# Patient Record
Sex: FEMALE | Race: WHITE
[De-identification: names, ages, dates, MRNs, and addresses within clinical notes are randomized per-mention and may not be internally consistent; named-entity substitution may affect disease eponyms.]

---

## 2020-05-08 LAB
ADD MANUAL DIFF: NO
APPEARANCE UR: CLEAR
APTT PPP: YELLOW S
BASOPHILS # BLD AUTO: 0.1 10^3/UL (ref 0–0.2)
BASOPHILS NFR BLD AUTO: 1.2 % (ref 0–2)
BILIRUB UR QL STRIP: NEGATIVE
EOSINOPHIL # BLD AUTO: 0 10^3/UL (ref 0–0.6)
EOSINOPHIL NFR BLD AUTO: 0.7 % (ref 0–6)
ERYTHROCYTE [DISTWIDTH] IN BLOOD BY AUTOMATED COUNT: 14.5 % (ref 11.5–14)
GLUCOSE UR STRIP-MCNC: NEGATIVE MG/DL
HCT VFR BLD CALC: 40.7 % (ref 36–47)
HGB BLD-MCNC: 13.9 G/DL (ref 12–15.5)
KETONES UR STRIP-MCNC: NEGATIVE MG/DL
LYMPHOCYTES # BLD AUTO: 2.2 10^3/UL (ref 0.5–4.7)
LYMPHOCYTES NFR BLD AUTO: 37.1 % (ref 13–45)
MCH RBC QN AUTO: 32.3 PG (ref 27–33.4)
MCHC RBC AUTO-ENTMCNC: 34.2 G/DL (ref 32–36)
MCV RBC AUTO: 95 FL (ref 80–97)
MONOCYTES # BLD AUTO: 0.4 10^3/UL (ref 0.1–1.4)
MONOCYTES NFR BLD AUTO: 6.1 % (ref 3–13)
NEUTROPHILS # BLD AUTO: 3.3 10^3/UL (ref 1.7–8.2)
NEUTS SEG NFR BLD AUTO: 54.9 % (ref 42–78)
NITRITE UR QL STRIP: NEGATIVE
PH UR STRIP: 7 [PH] (ref 5–9)
PLATELET # BLD: 287 10^3/UL (ref 150–450)
PROT UR STRIP-MCNC: NEGATIVE MG/DL
RBC # BLD AUTO: 4.3 10^6/UL (ref 3.72–5.28)
SP GR UR STRIP: 1.01
TOTAL CELLS COUNTED % (AUTO): 100 %
UROBILINOGEN UR-MCNC: NEGATIVE MG/DL (ref ?–2)
WBC # BLD AUTO: 6 10^3/UL (ref 4–10.5)

## 2020-05-08 NOTE — EKG REPORT
SEVERITY:- ABNORMAL ECG -

SINUS RHYTHM

PROBABLE LEFT ATRIAL ABNORMALITY

PROBABLE ANTEROSEPTAL INFARCT, AGE INDETERM

:

Confirmed by: Ian Oliver MD 08-May-2020 12:52:50

## 2020-05-08 NOTE — RADIOLOGY REPORT (SQ)
EXAM DESCRIPTION:  CHEST PA/LATERAL



IMAGES COMPLETED DATE/TIME:  5/8/2020 12:16 pm



REASON FOR STUDY:  PRE-OP



COMPARISON:  2/18/2020



EXAM PARAMETERS:  NUMBER OF VIEWS: two views

TECHNIQUE: Digital Frontal and Lateral radiographic views of the chest acquired.

RADIATION DOSE: NA

LIMITATIONS: none



FINDINGS:  LUNGS AND PLEURA: Lung fields are hyperexpanded.  No consolidation or effusions.  No pneum
othorax.

MEDIASTINUM AND HILAR STRUCTURES: No masses or contour abnormalities.

HEART AND VASCULAR STRUCTURES: Heart normal size.  No evidence for failure.

BONES: No acute findings.

HARDWARE: None in the chest.

OTHER: No other significant finding.



IMPRESSION:  COPD.  No acute findings in the chest.



TECHNICAL DOCUMENTATION:  JOB ID:  [id number]

 2011 "[employer]- All Rights Reserved



Reading location - IP/workstation name: [email or web address]

## 2020-05-11 ENCOUNTER — HOSPITAL ENCOUNTER (INPATIENT)
Dept: HOSPITAL 62 - INOR | Age: 70
LOS: 1 days | Discharge: HOME HEALTH | DRG: 466 | End: 2020-05-12
Attending: ORTHOPAEDIC SURGERY | Admitting: ORTHOPAEDIC SURGERY
Payer: MEDICARE

## 2020-05-11 DIAGNOSIS — M79.7: ICD-10-CM

## 2020-05-11 DIAGNOSIS — Z79.899: ICD-10-CM

## 2020-05-11 DIAGNOSIS — S72.091A: ICD-10-CM

## 2020-05-11 DIAGNOSIS — M81.0: ICD-10-CM

## 2020-05-11 DIAGNOSIS — T84.030A: Primary | ICD-10-CM

## 2020-05-11 DIAGNOSIS — M25.551: ICD-10-CM

## 2020-05-11 DIAGNOSIS — Z96.641: ICD-10-CM
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
## 2020-05-11 DIAGNOSIS — J44.9: ICD-10-CM

## 2020-05-11 LAB
ANION GAP SERPL CALC-SCNC: 5 MMOL/L (ref 5–19)
BUN SERPL-MCNC: 22 MG/DL (ref 7–20)
CALCIUM: 9.9 MG/DL (ref 8.4–10.2)
CHLORIDE SERPL-SCNC: 101 MMOL/L (ref 98–107)
CO2 SERPL-SCNC: 32 MMOL/L (ref 22–30)
GLUCOSE SERPL-MCNC: 97 MG/DL (ref 75–110)
POTASSIUM SERPL-SCNC: 5 MMOL/L (ref 3.6–5)

## 2020-05-11 PROCEDURE — 81001 URINALYSIS AUTO W/SCOPE: CPT

## 2020-05-11 PROCEDURE — 86900 BLOOD TYPING SEROLOGIC ABO: CPT

## 2020-05-11 PROCEDURE — 87635 SARS-COV-2 COVID-19 AMP PRB: CPT

## 2020-05-11 PROCEDURE — 01215 ANES OPN REVJ TOT HIP ARTHRP: CPT

## 2020-05-11 PROCEDURE — 94799 UNLISTED PULMONARY SVC/PX: CPT

## 2020-05-11 PROCEDURE — 0SPR0JZ REMOVAL OF SYNTHETIC SUBSTITUTE FROM RIGHT HIP JOINT, FEMORAL SURFACE, OPEN APPROACH: ICD-10-PCS | Performed by: ORTHOPAEDIC SURGERY

## 2020-05-11 PROCEDURE — 93010 ELECTROCARDIOGRAM REPORT: CPT

## 2020-05-11 PROCEDURE — 71046 X-RAY EXAM CHEST 2 VIEWS: CPT

## 2020-05-11 PROCEDURE — 86901 BLOOD TYPING SEROLOGIC RH(D): CPT

## 2020-05-11 PROCEDURE — 87070 CULTURE OTHR SPECIMN AEROBIC: CPT

## 2020-05-11 PROCEDURE — 36415 COLL VENOUS BLD VENIPUNCTURE: CPT

## 2020-05-11 PROCEDURE — 85027 COMPLETE CBC AUTOMATED: CPT

## 2020-05-11 PROCEDURE — 93005 ELECTROCARDIOGRAM TRACING: CPT

## 2020-05-11 PROCEDURE — 0SRR0JZ REPLACEMENT OF RIGHT HIP JOINT, FEMORAL SURFACE WITH SYNTHETIC SUBSTITUTE, OPEN APPROACH: ICD-10-PCS | Performed by: ORTHOPAEDIC SURGERY

## 2020-05-11 PROCEDURE — 87075 CULTR BACTERIA EXCEPT BLOOD: CPT

## 2020-05-11 PROCEDURE — 85025 COMPLETE CBC W/AUTO DIFF WBC: CPT

## 2020-05-11 PROCEDURE — 80048 BASIC METABOLIC PNL TOTAL CA: CPT

## 2020-05-11 PROCEDURE — C9290 INJ, BUPIVACAINE LIPOSOME: HCPCS

## 2020-05-11 PROCEDURE — 86850 RBC ANTIBODY SCREEN: CPT

## 2020-05-11 PROCEDURE — 87205 SMEAR GRAM STAIN: CPT

## 2020-05-11 RX ADMIN — IBUPROFEN SCH MLS/HR: 800 INJECTION INTRAVENOUS at 21:06

## 2020-05-11 RX ADMIN — OXYCODONE HYDROCHLORIDE SCH MG: 10 TABLET, FILM COATED, EXTENDED RELEASE ORAL at 21:05

## 2020-05-11 RX ADMIN — GABAPENTIN SCH MG: 300 CAPSULE ORAL at 21:05

## 2020-05-11 RX ADMIN — OXYCODONE HYDROCHLORIDE PRN MG: 5 TABLET ORAL at 18:25

## 2020-05-11 RX ADMIN — SENNOSIDES, DOCUSATE SODIUM SCH: 50; 8.6 TABLET, FILM COATED ORAL at 18:14

## 2020-05-11 RX ADMIN — Medication SCH ML: at 21:21

## 2020-05-11 NOTE — OPERATIVE REPORT
Operative Report


DATE OF SURGERY: 05/11/20


PREOPERATIVE DIAGNOSIS: Failed right hip arthroplasty


OPERATION: Revision right hip arthroplasty


SURGEON: JOEY MEADE


ANESTHESIA: Spinal


TISSUE REMOVED OR ALTERED: Cultures to microbiology.  Implant to CSS


ESTIMATED BLOOD LOSS: 100


PROCEDURE: 


Implants used:


Femur: Chaitanya modular restoration stem 16 mm x 155 mm, 23 mm proximal standard 

body, 36 mm chrome cobalt standard neck


Acetabular shell:[]





Liner:[]





Head:[]


The patient is placed in a left lateral decubitus position on the operating 

table.  The right lower extremity and hindquarter is prepped and draped in a 

sterile fashion.  A curvilinear incision was made over the greater is debrided a

 posterior approach the hip was taken.  Upon entering the capsule cultures sent 

for culture and sensitivity.  The hip is dislocated and the femoral head and 

neck visualized.  The femoral head is disimpacted from the trunnion.  The 

overgrowth of the greater trochanter is debrided and the underlying femoral stem

 is removed.











The femur was then prepared using a series of conical reamers until a 16 mm 

reamer is seated.  A 16 mm x 155 mm stem is then impacted into the proximal f

emoral diaphysis.  The proximal femur is then prepared for 23 mm proximal body. 

 A trial 23 mm proximal body is impacted onto the trunnion.  A trial reduction 

was now performed using a 36 millimeters head with standard neck.  Preoperative 

leg length significantly improved but may be slightly less than the 

contralateral extremity.  And is excellent anterior posterior stability.  A 

decision was made to proceed with the above construct.





All trial implants were removed.  The wound is irrigated with pulsed lavage.  A 

3 mm proximal body is impacted onto the trunnion and secured with a screw and a 

torque wrench.  the final chrome-cobalt head is impacted onto the trunnion.  The

 hip was reduced.  Wound is copiously irrigated with pulsed lavage.  Wound is 

closed in layers using interrupted Vicryl followed by staples.  A sterile 

dressing is applied and the patient's returned to recovery room in satisfactory 

patient.

## 2020-05-11 NOTE — RADIOLOGY REPORT (SQ)
EXAM DESCRIPTION:  HIP RIGHT AP/LATERAL



IMAGES COMPLETED DATE/TIME:  5/11/2020 3:12 pm



REASON FOR STUDY:  Post Op  Long Cassette in PACU S72.001D  FX UNSP PART OF NK OF R FEMR, SUBS FOR CL
OS FX W RO M25.551  PAIN IN RIGHT HIP



COMPARISON:  None.



NUMBER OF VIEWS:  Two views.



TECHNIQUE:  AP pelvis and additional frog-leg view of the right hip.



LIMITATIONS:  None.



FINDINGS:  MINERALIZATION: Normal.

RIGHT HIP: Status post right FERNADNO ; the hardware is anatomic alignment.  There is no periprosthetic fr
acture.

LEFT HIP: No fracture or dislocation.  No worrisome bone lesions.

PUBIS AND ISCHIUM: The ilioischial and iliopectineal lines are intact.  There is no diastasis of the 
pubic symphysis.

PELVIS: No fracture.

SACRUM: The sacrum is obscured by overlying bowel.

LOWER LUMBAR SPINE: The lower lumbar spine is obscured by overlying bowel.

SOFT TISSUES: Subcutaneous emphysema lateral and superior to the right femur.

OTHER: No other finding.



IMPRESSION:  Status post right FERNANDO with expected immediate postoperative findings.



TECHNICAL DOCUMENTATION:  JOB ID:  [id number]

 2011 [employer]- All Rights Reserved



Reading location - IP/workstation name: [email or web address]

## 2020-05-12 VITALS — SYSTOLIC BLOOD PRESSURE: 144 MMHG | DIASTOLIC BLOOD PRESSURE: 74 MMHG

## 2020-05-12 LAB
ANION GAP SERPL CALC-SCNC: 7 MMOL/L (ref 5–19)
BUN SERPL-MCNC: 22 MG/DL (ref 7–20)
CALCIUM: 9.1 MG/DL (ref 8.4–10.2)
CHLORIDE SERPL-SCNC: 100 MMOL/L (ref 98–107)
CO2 SERPL-SCNC: 30 MMOL/L (ref 22–30)
ERYTHROCYTE [DISTWIDTH] IN BLOOD BY AUTOMATED COUNT: 14.5 % (ref 11.5–14)
GLUCOSE SERPL-MCNC: 108 MG/DL (ref 75–110)
HCT VFR BLD CALC: 36.9 % (ref 36–47)
HGB BLD-MCNC: 12.6 G/DL (ref 12–15.5)
MCH RBC QN AUTO: 32.1 PG (ref 27–33.4)
MCHC RBC AUTO-ENTMCNC: 34.1 G/DL (ref 32–36)
MCV RBC AUTO: 94 FL (ref 80–97)
PLATELET # BLD: 297 10^3/UL (ref 150–450)
POTASSIUM SERPL-SCNC: 4.4 MMOL/L (ref 3.6–5)
RBC # BLD AUTO: 3.92 10^6/UL (ref 3.72–5.28)
WBC # BLD AUTO: 8.9 10^3/UL (ref 4–10.5)

## 2020-05-12 RX ADMIN — GABAPENTIN SCH MG: 300 CAPSULE ORAL at 05:20

## 2020-05-12 RX ADMIN — IBUPROFEN SCH MLS/HR: 800 INJECTION INTRAVENOUS at 05:20

## 2020-05-12 RX ADMIN — OXYCODONE HYDROCHLORIDE PRN MG: 5 TABLET ORAL at 02:26

## 2020-05-12 RX ADMIN — Medication SCH: at 06:52

## 2020-05-12 RX ADMIN — OXYCODONE HYDROCHLORIDE SCH MG: 10 TABLET, FILM COATED, EXTENDED RELEASE ORAL at 09:52

## 2020-05-12 RX ADMIN — SENNOSIDES, DOCUSATE SODIUM SCH EACH: 50; 8.6 TABLET, FILM COATED ORAL at 09:52

## 2020-05-12 NOTE — PDOC DISCHARGE SUMMARY
Impression





- Admit/DC Date/PCP


Admission Date/Primary Care Provider: 


  05/11/20 09:12





  RENNY BECERRIL MD





Discharge Date: 05/12/20





- Discharge Diagnosis


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


Is this a current diagnosis for this admission?: Yes   





- Assessment


Summary: 


Patient is a 69-year-old white female status post right hip arthroplasty for 

femoral neck fracture with progressive stem subsidence and loosening.  Patient 

is admitted for elective revision right hip arthroplasty.





- Additional Information


Resuscitation Status: Full Code


Discharge Diet: Regular


Discharge Activity: Balance Activity w/Rest, No tub bath


Referrals: 


JEOY MEADE MD [ACTIVE STAFF] - 05/21/20 11:00 am


Home Medications: 








Albuterol Sulfate [Ventolin Hfa 8 gm Mdi (1 Mdi/ER Disp)] 2 puff IH Q6HP PRN 

12/05/18 


Tramadol HCl [Ultram] 50 mg PO Q8HP PRN  MG (1-2 TAB PER DOSE) 12/05/18 


Cyclobenzaprine HCl [Flexeril 10 mg Tablet] 10 mg PO BIDP PRN 02/17/20 


Gabapentin [Neurontin] 600 mg PO Q8 02/17/20 


Oxycodone HCl/Acetaminophen [Oxycodone-Acetaminophen ] 1 each PO Q8HP PRN 

05/08/20 











History of Present Illiness


History of Present Illness: 


KENTON THOMAS is a 69 year old female


White female with progressive right hip pain and functional disability secondary

to a loose femoral stem.  Patient is admitted for elective right hip revision 

arthroplasty.





Hospital Course


Hospital Course: 


Patient is admitted through the operating where she undergoes an uncomplicated 

right hip revision arthroplasty.  She was returned to the floor in satisfactory 

condition.  She ambulates independently without pain and with minimal functional

compromise.





Physical Exam


Vital Signs: 


                                        











Temp Pulse Resp BP Pulse Ox


 


 36.7 C   76   18   138/63 H  98 


 


 05/11/20 23:21  05/11/20 23:21  05/11/20 23:21  05/11/20 23:21  05/11/20 23:21








                                 Intake & Output











 05/11/20 05/12/20 05/13/20





 06:59 06:59 06:59


 


Intake Total  5999 


 


Output Total  3334 


 


Balance  2665 


 


Weight  60.1 kg 











General appearance: PRESENT: no acute distress, mild distress


Head exam: PRESENT: normocephalic


Respiratory exam: PRESENT: unlabored


Cardiovascular exam: PRESENT: RRR


Pulses: PRESENT: +1 pedal pulses bilateral


GI/Abdominal exam: PRESENT: soft


Rectal exam: PRESENT: deferred


Musculoskeletal exam: PRESENT: other - Leg lengths close to being equal.  Distal

neurovascular examination is intact.  Right hip dressing dry.


Neurological exam: PRESENT: alert, awake, oriented to person, oriented to place,

oriented to time, oriented to situation.  ABSENT: motor sensory deficit


Psychiatric exam: PRESENT: appropriate affect, normal mood.  ABSENT: homicidal 

ideation, suicidal ideation


Skin exam: PRESENT: dry, intact, warm.  ABSENT: cyanosis, rash





Results


Laboratory Results: 


                                        











WBC  8.9 10^3/uL (4.0-10.5)   05/12/20  05:40    


 


RBC  3.92 10^6/uL (3.72-5.28)   05/12/20  05:40    


 


Hgb  12.6 g/dL (12.0-15.5)   05/12/20  05:40    


 


Hct  36.9 % (36.0-47.0)   05/12/20  05:40    


 


MCV  94 fl (80-97)   05/12/20  05:40    


 


MCH  32.1 pg (27.0-33.4)   05/12/20  05:40    


 


MCHC  34.1 g/dL (32.0-36.0)   05/12/20  05:40    


 


RDW  14.5 % (11.5-14.0)  H  05/12/20  05:40    


 


Plt Count  297 10^3/uL (150-450)   05/12/20  05:40    


 


Lymph % (Auto)  37.1 % (13-45)   05/08/20  11:56    


 


Mono % (Auto)  6.1 % (3-13)   05/08/20  11:56    


 


Eos % (Auto)  0.7 % (0-6)   05/08/20  11:56    


 


Baso % (Auto)  1.2 % (0-2)   05/08/20  11:56    


 


Absolute Neuts (auto)  3.3 10^3/uL (1.7-8.2)   05/08/20  11:56    


 


Absolute Lymphs (auto)  2.2 10^3/uL (0.5-4.7)   05/08/20  11:56    


 


Absolute Monos (auto)  0.4 10^3/uL (0.1-1.4)   05/08/20  11:56    


 


Absolute Eos (auto)  0.0 10^3/uL (0.0-0.6)   05/08/20  11:56    


 


Absolute Basos (auto)  0.1 10^3/uL (0.0-0.2)   05/08/20  11:56    


 


Seg Neutrophils %  54.9 % (42-78)   05/08/20  11:56    


 


Sodium  136.9 mmol/L (137-145)  L  05/12/20  05:40    


 


Potassium  4.4 mmol/L (3.6-5.0)   05/12/20  05:40    


 


Chloride  100 mmol/L ()   05/12/20  05:40    


 


Carbon Dioxide  30 mmol/L (22-30)   05/12/20  05:40    


 


Anion Gap  7  (5-19)   05/12/20  05:40    


 


BUN  22 mg/dL (7-20)  H  05/12/20  05:40    


 


Creatinine  0.81 mg/dL (0.52-1.25)   05/12/20  05:40    


 


Est GFR ( Amer)  > 60  (>60)   05/12/20  05:40    


 


Est GFR (Non-Af Amer)  Cancelled   05/08/20  11:56    


 


Est GFR (MDRD) Non-Af  > 60  (>60)   05/12/20  05:40    


 


Glucose  108 mg/dL ()   05/12/20  05:40    


 


Calcium  9.1 mg/dL (8.4-10.2)   05/12/20  05:40    


 


EGFR   Cancelled   05/08/20  11:56    


 


Urine Color  YELLOW   05/08/20  11:45    


 


Urine Appearance  CLEAR   05/08/20  11:45    


 


Urine pH  7.0  (5.0-9.0)   05/08/20  11:45    


 


Ur Specific Gravity  1.011   05/08/20  11:45    


 


Urine Protein  NEGATIVE mg/dL (NEGATIVE)   05/08/20  11:45    


 


Urine Glucose (UA)  NEGATIVE mg/dL (NEGATIVE)   05/08/20  11:45    


 


Urine Ketones  NEGATIVE mg/dL (NEGATIVE)   05/08/20  11:45    


 


Urine Blood  NEGATIVE  (NEGATIVE)   05/08/20  11:45    


 


Urine Nitrite  NEGATIVE  (NEGATIVE)   05/08/20  11:45    


 


Urine Bilirubin  NEGATIVE  (NEGATIVE)   05/08/20  11:45    


 


Urine Urobilinogen  NEGATIVE mg/dL (<2.0)   05/08/20  11:45    


 


Ur Leukocyte Esterase  NEGATIVE  (NEGATIVE)   05/08/20  11:45    


 


Urine WBC (Auto)  1 /HPF  05/08/20  11:45    


 


Urine RBC (Auto)  3 /HPF  05/08/20  11:45    


 


Urine Bacteria (Auto)  TRACE /HPF  05/08/20  11:45    


 


Squamous Epi Cells Auto  3 /HPF  05/08/20  11:45    


 


Urine Mucus (Auto)  RARE /LPF  05/08/20  11:45    


 


Urine Ascorbic Acid  NEGATIVE  (NEGATIVE)   05/08/20  11:45    


 


COVID-19 Source  NASOPHARYNGEAL   05/08/20  11:53    


 


COVID-19 ([facility])  NOT DETECTED   05/08/20  11:53    


 


Blood Type  B POSITIVE   05/11/20  09:52    


 


Antibody Screen  NEGATIVE   05/11/20  09:52    











Impressions: 


                                        





Chest X-Ray  05/08/20 12:07


IMPRESSION:  COPD.  No acute findings in the chest.


 








Hip/Pelvis X-Ray  05/11/20 14:22


IMPRESSION:  Status post right FERNANDO with expected immediate postoperative 

findings.


 














Plan


Plan of Treatment: 


Patient to be discharged home with home health services and DME on a 

weightbearing as tolerated basis.  Follow-up with Dr. Meade and [facility]

for surgery in 2 weeks for wound inspection.





Stroke


Is this a Stroke Patient?: No


Stroke Pt being discharged on Anti-thrombolytic therapy?: Yes





Acute Heart Failure





- **


Is this a Heart Failure Patient?: No